# Patient Record
Sex: FEMALE | Race: BLACK OR AFRICAN AMERICAN | NOT HISPANIC OR LATINO | Employment: STUDENT | URBAN - METROPOLITAN AREA
[De-identification: names, ages, dates, MRNs, and addresses within clinical notes are randomized per-mention and may not be internally consistent; named-entity substitution may affect disease eponyms.]

---
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
## 2025-06-26 ENCOUNTER — HOSPITAL ENCOUNTER (EMERGENCY)
Facility: CLINIC | Age: 20
Discharge: HOME | End: 2025-06-26
Attending: EMERGENCY MEDICINE
Payer: COMMERCIAL

## 2025-06-26 VITALS
TEMPERATURE: 98.3 F | HEART RATE: 72 BPM | RESPIRATION RATE: 16 BRPM | OXYGEN SATURATION: 97 % | WEIGHT: 145.8 LBS | BODY MASS INDEX: 25.83 KG/M2 | HEIGHT: 63 IN | SYSTOLIC BLOOD PRESSURE: 120 MMHG | DIASTOLIC BLOOD PRESSURE: 77 MMHG

## 2025-06-26 DIAGNOSIS — R10.2 PELVIC PAIN: ICD-10-CM

## 2025-06-26 LAB
ALBUMIN UR-MCNC: 10 MG/DL
APPEARANCE UR: ABNORMAL
BILIRUB UR QL STRIP: NEGATIVE
COLOR UR AUTO: YELLOW
GLUCOSE UR STRIP-MCNC: NEGATIVE MG/DL
HCG UR QL: NEGATIVE
HGB UR QL STRIP: ABNORMAL
KETONES UR STRIP-MCNC: 10 MG/DL
LEUKOCYTE ESTERASE UR QL STRIP: NEGATIVE
NITRATE UR QL: NEGATIVE
PH UR STRIP: 5.5 [PH] (ref 5–7)
RBC URINE: >182 /HPF
SP GR UR STRIP: 1.02 (ref 1–1.03)
SQUAMOUS EPITHELIAL: <1 /HPF
UROBILINOGEN UR STRIP-MCNC: NORMAL MG/DL
WBC URINE: 5 /HPF

## 2025-06-26 PROCEDURE — 99284 EMERGENCY DEPT VISIT MOD MDM: CPT | Performed by: EMERGENCY MEDICINE

## 2025-06-26 PROCEDURE — 96372 THER/PROPH/DIAG INJ SC/IM: CPT | Performed by: EMERGENCY MEDICINE

## 2025-06-26 PROCEDURE — 81001 URINALYSIS AUTO W/SCOPE: CPT | Performed by: EMERGENCY MEDICINE

## 2025-06-26 PROCEDURE — 250N000013 HC RX MED GY IP 250 OP 250 PS 637: Performed by: EMERGENCY MEDICINE

## 2025-06-26 PROCEDURE — 81025 URINE PREGNANCY TEST: CPT | Performed by: EMERGENCY MEDICINE

## 2025-06-26 PROCEDURE — 250N000011 HC RX IP 250 OP 636: Performed by: EMERGENCY MEDICINE

## 2025-06-26 RX ORDER — ONDANSETRON 4 MG/1
4 TABLET, ORALLY DISINTEGRATING ORAL ONCE
Status: COMPLETED | OUTPATIENT
Start: 2025-06-26 | End: 2025-06-26

## 2025-06-26 RX ORDER — KETOROLAC TROMETHAMINE 15 MG/ML
15 INJECTION, SOLUTION INTRAMUSCULAR; INTRAVENOUS ONCE
Status: COMPLETED | OUTPATIENT
Start: 2025-06-26 | End: 2025-06-26

## 2025-06-26 RX ORDER — ACETAMINOPHEN 500 MG
1000 TABLET ORAL ONCE
Status: COMPLETED | OUTPATIENT
Start: 2025-06-26 | End: 2025-06-26

## 2025-06-26 RX ADMIN — ONDANSETRON 4 MG: 4 TABLET, ORALLY DISINTEGRATING ORAL at 17:20

## 2025-06-26 RX ADMIN — ACETAMINOPHEN 1000 MG: 500 TABLET ORAL at 17:18

## 2025-06-26 RX ADMIN — KETOROLAC TROMETHAMINE 15 MG: 15 INJECTION, SOLUTION INTRAMUSCULAR; INTRAVENOUS at 17:20

## 2025-06-26 ASSESSMENT — ACTIVITIES OF DAILY LIVING (ADL)
ADLS_ACUITY_SCORE: 41
ADLS_ACUITY_SCORE: 41

## 2025-06-26 ASSESSMENT — COLUMBIA-SUICIDE SEVERITY RATING SCALE - C-SSRS
2. HAVE YOU ACTUALLY HAD ANY THOUGHTS OF KILLING YOURSELF IN THE PAST MONTH?: NO
6. HAVE YOU EVER DONE ANYTHING, STARTED TO DO ANYTHING, OR PREPARED TO DO ANYTHING TO END YOUR LIFE?: NO
1. IN THE PAST MONTH, HAVE YOU WISHED YOU WERE DEAD OR WISHED YOU COULD GO TO SLEEP AND NOT WAKE UP?: NO

## 2025-06-26 NOTE — ED PROVIDER NOTES
"ED Provider Note  [facility]      History     Chief Complaint   Patient presents with    Menstrual Problem     HPI  Ani Dangelo is a 20 year old female who presents to the emergency department for a menstrual problem.  Patient reports minimal bleeding but she is on her period at this point.  No chest pain, shortness of breath or dizziness.  No fevers.  She is asking for some pain medications.  She is trying naproxen and Tylenol.  She has not wanted to OCPs she has discussed doing OCPs with her gynecologist.    Past Medical History  No past medical history on file.  No past surgical history on file.  fluconazole (DIFLUCAN) 150 MG tablet      No Known Allergies  Family History  No family history on file.  Social History       A medically appropriate review of systems was performed with pertinent positives and negatives noted in the HPI, and all other systems negative.    Physical Exam   BP: 120/82  Pulse: 74  Temp: 98.3  F (36.8  C)  Resp: 16  Height: 160 cm (5' 3\")  Weight: 66.1 kg (145 lb 12.8 oz)  SpO2: 97 %  Physical Exam  Physical Exam   Constitutional: oriented to person, place, and time. appears well-developed and well-nourished.   HENT:   Head: Normocephalic and atraumatic.   Neck: Normal range of motion.   Pulmonary/Chest: Effort normal. No respiratory distress.   Cardiac: No murmurs, rubs, gallops. RRR.  Abdominal: Abdomen soft, nontender, nondistended. No rebound tenderness.  MSK: Long bones without deformity or evidence of trauma  Neurological: alert and oriented to person, place, and time.   Skin: Skin is warm and dry.   Psychiatric:  normal mood and affect.  behavior is normal. Thought content normal.      ED Course, Procedures, & Data      Procedures             Medications   acetaminophen (TYLENOL) tablet 1,000 mg (1,000 mg Oral $Given 6/26/25 1718)   ondansetron (ZOFRAN ODT) ODT tab 4 mg (4 mg Oral $Given 6/26/25 1720)   ketorolac (TORADOL) injection 15 mg (15 mg " Intramuscular $Given 6/26/25 6381)          Critical care was not performed.     Medical Decision Making  The patient's presentation was of moderate complexity (a chronic illness mild to moderate exacerbation, progression, or side effect of treatment).    The patient's evaluation involved:  strong consideration of a test (labs, ultrasound) that was ultimately deferred    The patient's management necessitated only low risk treatment.    Assessment & Plan    Patient resenting with ongoing pelvic pain.  This has been a several year problem for her with her cycles due to endometriosis.  She is declining any labs, exam or ultrasound which seems reasonable as this seems to be a chronic problem for her.  She is given Toradol.  She is eating and drinking without difficulty here was appears well with benign abdomen.  Did offer to start OCPs however she declines.  Discussed following up with gynecology.    I have reviewed the nursing notes. I have reviewed the findings, diagnosis, plan and need for follow up with the patient.    New Prescriptions    No medications on file       Final diagnoses:   Pelvic pain       Stevo Zepeda MD  [facility] EMERGENCY DEPARTMENT  6/26/2025     Stevo Zepeda MD  06/26/25 7161

## 2025-06-26 NOTE — ED TRIAGE NOTES
Patient states she is here for vaginal cramping. Started her cycle today. Denies any contraceptives. Patient is not sexually active. Reports every cycle she gets nauseous, vomits and has very bad cramping.

## 2025-06-27 NOTE — DISCHARGE INSTRUCTIONS
Naproxen dosing:  Adults--At first, 250 milligrams (mg) (10 milliliters (mL)/2 teaspoonfuls), 375 mg (15 mL/3 teaspoonfuls), or 500 mg (20 mL/4 teaspoonfuls) 2 times a day, in the morning and evening. Your doctor may adjust your dose as needed. However the dose is usually not more than 1500 mg per day.    You can also take Tylenol for your pain in addition to naproxen.    Please follow-up with gynecology to discuss next options.